# Patient Record
Sex: MALE | Race: WHITE | NOT HISPANIC OR LATINO
[De-identification: names, ages, dates, MRNs, and addresses within clinical notes are randomized per-mention and may not be internally consistent; named-entity substitution may affect disease eponyms.]

---

## 2023-07-24 ENCOUNTER — TRANSCRIPTION ENCOUNTER (OUTPATIENT)
Age: 33
End: 2023-07-24

## 2023-08-08 ENCOUNTER — NON-APPOINTMENT (OUTPATIENT)
Age: 33
End: 2023-08-08

## 2023-08-09 ENCOUNTER — APPOINTMENT (OUTPATIENT)
Dept: VASCULAR SURGERY | Facility: CLINIC | Age: 33
End: 2023-08-09
Payer: COMMERCIAL

## 2023-08-09 ENCOUNTER — NON-APPOINTMENT (OUTPATIENT)
Age: 33
End: 2023-08-09

## 2023-08-09 VITALS — SYSTOLIC BLOOD PRESSURE: 136 MMHG | HEART RATE: 71 BPM | DIASTOLIC BLOOD PRESSURE: 70 MMHG

## 2023-08-09 DIAGNOSIS — Z80.3 FAMILY HISTORY OF MALIGNANT NEOPLASM OF BREAST: ICD-10-CM

## 2023-08-09 DIAGNOSIS — I82.409 ACUTE EMBOLISM AND THROMBOSIS OF UNSPECIFIED DEEP VEINS OF UNSPECIFIED LOWER EXTREMITY: ICD-10-CM

## 2023-08-09 DIAGNOSIS — I82.890 ACUTE EMBOLISM AND THROMBOSIS OF OTHER SPECIFIED VEINS: ICD-10-CM

## 2023-08-09 DIAGNOSIS — Z78.9 OTHER SPECIFIED HEALTH STATUS: ICD-10-CM

## 2023-08-09 PROBLEM — Z00.00 ENCOUNTER FOR PREVENTIVE HEALTH EXAMINATION: Status: ACTIVE | Noted: 2023-08-09

## 2023-08-09 PROCEDURE — 99204 OFFICE O/P NEW MOD 45 MIN: CPT

## 2023-08-09 PROCEDURE — 93971 EXTREMITY STUDY: CPT

## 2023-08-09 RX ORDER — RIVAROXABAN 2.5 MG/1
TABLET, FILM COATED ORAL
Refills: 0 | Status: ACTIVE | COMMUNITY

## 2023-08-09 NOTE — REVIEW OF SYSTEMS
[Joint Swelling] : joint swelling [Limb Swelling] : limb swelling [Fever] : no fever [Chills] : no chills [Leg Claudication] : no intermittent leg claudication [Lower Ext Edema] : no extremity edema [Joint Stiffness] : no joint stiffness [Limb Pain] : no limb pain

## 2023-08-09 NOTE — ASSESSMENT
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 He reports mild left arm edema and worsening discomfort with exercise.  The patient underwent a left upper extremity venous duplex that demonstrated a chronic left upper extremity subclavian DVT. The patient had minimal edema and I do not think there is a role for vascular intervention. I believe that any further intervention would create more morbidity for the patient given his mild symptoms. I discussed continuing Xarelto with the patient. He would like to continue to take Xarelto 10 mg daily. I am unsure if this is necessary but I do not think it is harmful.   I recommended the patient wear a compression sleeve on the left when he exercises.

## 2023-08-09 NOTE — DATA REVIEWED
[FreeTextEntry1] : Left upper extremity venous duplex - personally reviewed - chronic left subclavian DVT with collaterals

## 2023-08-09 NOTE — PHYSICAL EXAM
[Alert] : alert [Oriented to Person] : oriented to person [Oriented to Place] : oriented to place [Oriented to Time] : oriented to time [JVD] : no jugular venous distention  [2+] : left 2+ [Ankle Swelling (On Exam)] : not present [Ankle Swelling Bilaterally] : bilaterally  [de-identified] : Awake and Alert. [de-identified] : No chest wall collaterals [de-identified] : mild LUE edema [de-identified] : No gross motor or sensory deficits. [de-identified] : Appropriate affect.

## 2023-08-13 PROBLEM — I82.409 DVT (DEEP VENOUS THROMBOSIS): Status: ACTIVE | Noted: 2023-08-09

## 2023-08-13 PROBLEM — I82.890 PAGET-SCHROETTER SYNDROME: Status: ACTIVE | Noted: 2023-08-09

## 2024-01-22 NOTE — HISTORY OF PRESENT ILLNESS
[FreeTextEntry1] : 32 yo male presents for an initial evaluation of Paget-Schroetter disease. The patient reports that he developed significant swelling of his left upper extremity in 2018. He was seen at [facility] and found to have a subclavian DVT and subclinical pulmonary emboli. He was subsequently seen at [facility]. He underwent a failed attempt at recanulazation of the vein and was started on anticoagulation. He continues to take Xarelto 10 mg qd. He reports that he underwent a negative hypercoagulable workup.  prescribed. The patient reports that he has mild edema and discomfort of his left upper extremity and shoulder which increases with exercise. 
right normal/left normal